# Patient Record
Sex: MALE | Race: BLACK OR AFRICAN AMERICAN | NOT HISPANIC OR LATINO
[De-identification: names, ages, dates, MRNs, and addresses within clinical notes are randomized per-mention and may not be internally consistent; named-entity substitution may affect disease eponyms.]

---

## 2018-04-30 PROBLEM — Z00.00 ENCOUNTER FOR PREVENTIVE HEALTH EXAMINATION: Status: ACTIVE | Noted: 2018-04-30

## 2018-05-08 ENCOUNTER — APPOINTMENT (OUTPATIENT)
Dept: SPINE | Facility: CLINIC | Age: 43
End: 2018-05-08
Payer: COMMERCIAL

## 2018-05-08 VITALS
BODY MASS INDEX: 22.96 KG/M2 | DIASTOLIC BLOOD PRESSURE: 74 MMHG | WEIGHT: 155 LBS | SYSTOLIC BLOOD PRESSURE: 118 MMHG | HEIGHT: 69 IN

## 2018-05-08 DIAGNOSIS — Z82.3 FAMILY HISTORY OF STROKE: ICD-10-CM

## 2018-05-08 DIAGNOSIS — Z80.42 FAMILY HISTORY OF MALIGNANT NEOPLASM OF PROSTATE: ICD-10-CM

## 2018-05-08 PROCEDURE — 99204 OFFICE O/P NEW MOD 45 MIN: CPT

## 2018-05-08 RX ORDER — NAPROXEN 500 MG/1
TABLET ORAL
Refills: 0 | Status: ACTIVE | COMMUNITY

## 2018-05-08 RX ORDER — METHOCARBAMOL 500 MG/1
500 TABLET, FILM COATED ORAL
Refills: 0 | Status: ACTIVE | COMMUNITY

## 2018-05-17 VITALS
HEART RATE: 65 BPM | DIASTOLIC BLOOD PRESSURE: 53 MMHG | SYSTOLIC BLOOD PRESSURE: 116 MMHG | HEIGHT: 69 IN | RESPIRATION RATE: 18 BRPM | TEMPERATURE: 98 F | WEIGHT: 159.84 LBS | OXYGEN SATURATION: 99 %

## 2018-05-18 ENCOUNTER — APPOINTMENT (OUTPATIENT)
Dept: SPINE | Facility: HOSPITAL | Age: 43
End: 2018-05-18
Payer: COMMERCIAL

## 2018-05-18 ENCOUNTER — INPATIENT (INPATIENT)
Facility: HOSPITAL | Age: 43
LOS: 0 days | Discharge: HOME | DRG: 520 | End: 2018-05-19
Attending: NEUROLOGICAL SURGERY | Admitting: NEUROLOGICAL SURGERY
Payer: COMMERCIAL

## 2018-05-18 LAB — GLUCOSE BLDC GLUCOMTR-MCNC: 120 MG/DL — HIGH (ref 70–99)

## 2018-05-18 PROCEDURE — 63047 LAM FACETEC & FORAMOT LUMBAR: CPT

## 2018-05-18 PROCEDURE — ZZZZZ: CPT

## 2018-05-18 PROCEDURE — 63047 LAM FACETEC & FORAMOT LUMBAR: CPT | Mod: 80

## 2018-05-18 RX ORDER — ONDANSETRON 8 MG/1
4 TABLET, FILM COATED ORAL EVERY 6 HOURS
Qty: 0 | Refills: 0 | Status: DISCONTINUED | OUTPATIENT
Start: 2018-05-18 | End: 2018-05-19

## 2018-05-18 RX ORDER — DEXAMETHASONE 0.5 MG/5ML
4 ELIXIR ORAL EVERY 6 HOURS
Qty: 0 | Refills: 0 | Status: COMPLETED | OUTPATIENT
Start: 2018-05-18 | End: 2018-05-19

## 2018-05-18 RX ORDER — SENNA PLUS 8.6 MG/1
2 TABLET ORAL AT BEDTIME
Qty: 0 | Refills: 0 | Status: DISCONTINUED | OUTPATIENT
Start: 2018-05-18 | End: 2018-05-19

## 2018-05-18 RX ORDER — CEFAZOLIN SODIUM 1 G
2000 VIAL (EA) INJECTION EVERY 8 HOURS
Qty: 0 | Refills: 0 | Status: COMPLETED | OUTPATIENT
Start: 2018-05-18 | End: 2018-05-19

## 2018-05-18 RX ORDER — INSULIN LISPRO 100/ML
VIAL (ML) SUBCUTANEOUS
Qty: 0 | Refills: 0 | Status: DISCONTINUED | OUTPATIENT
Start: 2018-05-18 | End: 2018-05-19

## 2018-05-18 RX ORDER — METHOCARBAMOL 500 MG/1
750 TABLET, FILM COATED ORAL
Qty: 0 | Refills: 0 | Status: DISCONTINUED | OUTPATIENT
Start: 2018-05-18 | End: 2018-05-19

## 2018-05-18 RX ORDER — GLUCAGON INJECTION, SOLUTION 0.5 MG/.1ML
1 INJECTION, SOLUTION SUBCUTANEOUS ONCE
Qty: 0 | Refills: 0 | Status: DISCONTINUED | OUTPATIENT
Start: 2018-05-18 | End: 2018-05-19

## 2018-05-18 RX ORDER — SODIUM CHLORIDE 9 MG/ML
1000 INJECTION, SOLUTION INTRAVENOUS
Qty: 0 | Refills: 0 | Status: DISCONTINUED | OUTPATIENT
Start: 2018-05-18 | End: 2018-05-19

## 2018-05-18 RX ORDER — HYDROMORPHONE HYDROCHLORIDE 2 MG/ML
0.5 INJECTION INTRAMUSCULAR; INTRAVENOUS; SUBCUTANEOUS
Qty: 0 | Refills: 0 | Status: DISCONTINUED | OUTPATIENT
Start: 2018-05-18 | End: 2018-05-18

## 2018-05-18 RX ORDER — ACETAMINOPHEN 500 MG
0 TABLET ORAL
Qty: 0 | Refills: 0 | COMMUNITY

## 2018-05-18 RX ORDER — OXYCODONE HYDROCHLORIDE 5 MG/1
10 TABLET ORAL EVERY 4 HOURS
Qty: 0 | Refills: 0 | Status: DISCONTINUED | OUTPATIENT
Start: 2018-05-18 | End: 2018-05-19

## 2018-05-18 RX ORDER — DEXTROSE 50 % IN WATER 50 %
15 SYRINGE (ML) INTRAVENOUS ONCE
Qty: 0 | Refills: 0 | Status: DISCONTINUED | OUTPATIENT
Start: 2018-05-18 | End: 2018-05-19

## 2018-05-18 RX ORDER — ACETAMINOPHEN 500 MG
1000 TABLET ORAL ONCE
Qty: 0 | Refills: 0 | Status: COMPLETED | OUTPATIENT
Start: 2018-05-18 | End: 2018-05-18

## 2018-05-18 RX ORDER — DEXTROSE 50 % IN WATER 50 %
25 SYRINGE (ML) INTRAVENOUS ONCE
Qty: 0 | Refills: 0 | Status: DISCONTINUED | OUTPATIENT
Start: 2018-05-18 | End: 2018-05-19

## 2018-05-18 RX ORDER — BUPIVACAINE 13.3 MG/ML
20 INJECTION, SUSPENSION, LIPOSOMAL INFILTRATION ONCE
Qty: 0 | Refills: 0 | Status: DISCONTINUED | OUTPATIENT
Start: 2018-05-18 | End: 2018-05-19

## 2018-05-18 RX ORDER — DOCUSATE SODIUM 100 MG
100 CAPSULE ORAL THREE TIMES A DAY
Qty: 0 | Refills: 0 | Status: DISCONTINUED | OUTPATIENT
Start: 2018-05-18 | End: 2018-05-19

## 2018-05-18 RX ORDER — METHOCARBAMOL 500 MG/1
2 TABLET, FILM COATED ORAL
Qty: 0 | Refills: 0 | COMMUNITY

## 2018-05-18 RX ORDER — HYDROMORPHONE HYDROCHLORIDE 2 MG/ML
0.5 INJECTION INTRAMUSCULAR; INTRAVENOUS; SUBCUTANEOUS EVERY 4 HOURS
Qty: 0 | Refills: 0 | Status: DISCONTINUED | OUTPATIENT
Start: 2018-05-18 | End: 2018-05-18

## 2018-05-18 RX ORDER — METOCLOPRAMIDE HCL 10 MG
5 TABLET ORAL EVERY 6 HOURS
Qty: 0 | Refills: 0 | Status: DISCONTINUED | OUTPATIENT
Start: 2018-05-18 | End: 2018-05-19

## 2018-05-18 RX ORDER — DEXTROSE 50 % IN WATER 50 %
12.5 SYRINGE (ML) INTRAVENOUS ONCE
Qty: 0 | Refills: 0 | Status: DISCONTINUED | OUTPATIENT
Start: 2018-05-18 | End: 2018-05-19

## 2018-05-18 RX ORDER — ONDANSETRON 8 MG/1
4 TABLET, FILM COATED ORAL ONCE
Qty: 0 | Refills: 0 | Status: DISCONTINUED | OUTPATIENT
Start: 2018-05-18 | End: 2018-05-19

## 2018-05-18 RX ORDER — PANTOPRAZOLE SODIUM 20 MG/1
40 TABLET, DELAYED RELEASE ORAL DAILY
Qty: 0 | Refills: 0 | Status: DISCONTINUED | OUTPATIENT
Start: 2018-05-18 | End: 2018-05-19

## 2018-05-18 RX ORDER — OXYCODONE HYDROCHLORIDE 5 MG/1
5 TABLET ORAL
Qty: 0 | Refills: 0 | Status: DISCONTINUED | OUTPATIENT
Start: 2018-05-18 | End: 2018-05-19

## 2018-05-18 RX ADMIN — Medication 400 MILLIGRAM(S): at 22:45

## 2018-05-18 RX ADMIN — ONDANSETRON 4 MILLIGRAM(S): 8 TABLET, FILM COATED ORAL at 17:46

## 2018-05-18 RX ADMIN — Medication 4 MILLIGRAM(S): at 17:46

## 2018-05-18 RX ADMIN — Medication 5 MILLIGRAM(S): at 21:20

## 2018-05-18 RX ADMIN — Medication 100 MILLIGRAM(S): at 21:19

## 2018-05-18 RX ADMIN — SENNA PLUS 2 TABLET(S): 8.6 TABLET ORAL at 22:43

## 2018-05-18 RX ADMIN — Medication 100 MILLIGRAM(S): at 22:43

## 2018-05-18 RX ADMIN — Medication 1000 MILLIGRAM(S): at 23:00

## 2018-05-18 NOTE — CONSULT NOTE ADULT - SUBJECTIVE AND OBJECTIVE BOX
Pre-surgical Pain Inventory  HPI:  This patient is a 42 year old man w/ a large L4-L5 herniated disc causing left foot weakness and bilateral lower extremity radicular pain that has been worsening in severity.  The note from the patient's office visit w/ Dr. Rice is pasted below:   Reports concern of LEFT foot weakness which began 1-2 months ago.  Attribute symptom onset to bending forward to grab something off the floor. Initially the symptoms were excruciating lower back pain with radiation to bilateral lower extremities LEFT > RIGHT. As time progressed he noticed accompanying symptoms of numbness/tingling of the extremities and weakness of the foot   He was initially treated with supervised physical therapy which seemed to alleviate the pain but the weakness of the foot was w/o improvement. He is currently on a NSAID regimen and muscle relaxers.  Reports difficulty walking/standing for extended periods.  Denies bladder/bowel dysfunction     Right: L4/5 heel walking 5/5, S1 toe walking 5/5.   Left: L4/5 heel walking 0/5 , S1 toe walking 4/5.   Sensory exam: light touch was intact and pain and temperature was intact.   Coordination:. the patient's balance was impaired.   Deep tendon reflexes:   Patella right 2+. Patella left 2+.    Ankle jerk right 2+. Ankle jerk left 2+.    Spine:   Lumbar/sacral spine examination demonstrates no visible abnormailities . straight leg raise of the left leg was positive. straight leg raise of the right leg was negative.   Gait: antalgic the patient was able to toe walk. the patient was not able to heel walk. (18 May 2018 12:21)      [facility]    Surgeon: Dr. Rice	    PAST MEDICAL & SURGICAL HISTORY:  Lumbar herniated disc  No significant past surgical history      Current Pre-op Pain Medications  Naproxen  Tylenol    Prescribed by: PCP    Past Pain Medication:   [du] Oxycodone  [du] Hydrocodone  [] Methadone  [] Fentanyl  [] Dilaudid  [] Morphine  [] Tramadol  [x] NSAIDs/Anti-inflammatories  [x] Tylenol   [] Medrol/Decadron  [] Neuropathic Agents  [x] Muscle Relaxants: Robaxin, no longer taking  [] Anxiolytics  [] Anti-depressants   [] Sleep aids    Allergies    No Known Allergies    Intolerances        Vital Signs:  Vital Signs Last 24 Hrs  T(C): --  T(F): --  HR: --  BP: --  BP(mean): --  RR: --  SpO2: --    Labs:       FUNCTIONAL ASSESSMENT:  AVERAGE DAILY PAIN SCORE: 6/10    PAIN ASSESSMENT:  Pt reports hx of LBP for several years which has worsened over past 2 months. Pt reports worsening back pain with significantly worsening BLE posterior/outer aspect LE numbness/tingling/sharp shooting pain with activity or prolonged sitting. States that he is unable to walk more than several feet as the BLE radicular pain becomes debilitating. Pt states pain is L>R posterior/outer aspect of thigh and extends more in LLE into foot and ankle vs shin with significant L foot weakness (foot drop) and stiffness.     Assessment:   42y Male presents for L45 [facility] Pre-surgical Pain Inventory  HPI:  This patient is a 42 year old man w/ a large L4-L5 herniated disc causing left foot weakness and bilateral lower extremity radicular pain that has been worsening in severity.  The note from the patient's office visit w/ Dr. Rice is pasted below:   Reports concern of LEFT foot weakness which began 1-2 months ago.  Attribute symptom onset to bending forward to grab something off the floor. Initially the symptoms were excruciating lower back pain with radiation to bilateral lower extremities LEFT > RIGHT. As time progressed he noticed accompanying symptoms of numbness/tingling of the extremities and weakness of the foot   He was initially treated with supervised physical therapy which seemed to alleviate the pain but the weakness of the foot was w/o improvement. He is currently on a NSAID regimen and muscle relaxers.  Reports difficulty walking/standing for extended periods.  Denies bladder/bowel dysfunction     Right: L4/5 heel walking 5/5, S1 toe walking 5/5.   Left: L4/5 heel walking 0/5 , S1 toe walking 4/5.   Sensory exam: light touch was intact and pain and temperature was intact.   Coordination:. the patient's balance was impaired.   Deep tendon reflexes:   Patella right 2+. Patella left 2+.    Ankle jerk right 2+. Ankle jerk left 2+.    Spine:   Lumbar/sacral spine examination demonstrates no visible abnormailities . straight leg raise of the left leg was positive. straight leg raise of the right leg was negative.   Gait: antalgic the patient was able to toe walk. the patient was not able to heel walk. (18 May 2018 12:21)      [facility]    Surgeon: Dr. Rice	    PAST MEDICAL & SURGICAL HISTORY:  Lumbar herniated disc  No significant past surgical history      Current Pre-op Pain Medications  Naproxen  Tylenol    Prescribed by: PCP    Past Pain Medication:   [du] Oxycodone  [du] Hydrocodone  [] Methadone  [] Fentanyl  [] Dilaudid  [] Morphine  [] Tramadol  [x] NSAIDs/Anti-inflammatories  [x] Tylenol   [] Medrol/Decadron  [] Neuropathic Agents  [x] Muscle Relaxants: Robaxin, no longer taking  [] Anxiolytics  [] Anti-depressants   [] Sleep aids    Allergies    No Known Allergies    Intolerances        Vital Signs:  Vital Signs Last 24 Hrs  T(C): --  T(F): --  HR: --  BP: --  BP(mean): --  RR: --  SpO2: --    Labs:       FUNCTIONAL ASSESSMENT:  AVERAGE DAILY PAIN SCORE: 6/10    PAIN ASSESSMENT:  Pt reports hx of LBP for several years which has worsened over past 2 months. Pt reports worsening back pain with significantly worsening BLE posterior/outer aspect LE numbness/tingling/sharp shooting pain with activity or prolonged sitting. States that he is unable to walk more than several feet as the BLE radicular pain becomes debilitating. Pt states pain is L>R posterior/outer aspect of thigh and extends more in LLE into foot and ankle vs shin with significant L foot weakness (foot drop) and stiffness.     Assessment:   42y Male presents for L45 lami    Plan:   Immediate post-op plan  - Dilaudid 0.5mg every 1 hour for Severe Breakthrough Pain -> every 3 hours for Severe Breakthrough Pain Floor->PACU  - Percocet 1-2 tabs every 4 hours for Mod-Severe Pain  - Robaxin 750mg TID PRN for Spasms    Rescue plan  - Dilaudid IVP -> PO if persistent  - Robaxin -> Valium if significant spasms    Tentative floor plan  - Robaxin  - Percocet  - Lyrica if persistent neuropathic pain    D/w Dr. Block

## 2018-05-18 NOTE — BRIEF OPERATIVE NOTE - PROCEDURE
<<-----Click on this checkbox to enter Procedure Lumbar laminectomy with discectomy  05/18/2018  L4 laminectomy for L4-L5 discectomy  Active  [id number]

## 2018-05-18 NOTE — H&P PST ADULT - HISTORY OF PRESENT ILLNESS
This patient is a 42 year old man w/ a large L4-L5 herniated disc causing left foot weakness and bilateral lower extremity radicular pain that has been worsening in severity.  The note from the patient's office visit w/ Dr. Rice is pasted below:   Reports concern of LEFT foot weakness which began 1-2 months ago.  Attribute symptom onset to bending forward to grab something off the floor. Initially the symptoms were excruciating lower back pain with radiation to bilateral lower extremities LEFT > RIGHT. As time progressed he noticed accompanying symptoms of numbness/tingling of the extremities and weakness of the foot   He was initially treated with supervised physical therapy which seemed to alleviate the pain but the weakness of the foot was w/o improvement. He is currently on a NSAID regimen and muscle relaxers.  Reports difficulty walking/standing for extended periods.  Denies bladder/bowel dysfunction     Right: L4/5 heel walking 5/5, S1 toe walking 5/5.   Left: L4/5 heel walking 0/5 , S1 toe walking 4/5.   Sensory exam: light touch was intact and pain and temperature was intact.   Coordination:. the patient's balance was impaired.   Deep tendon reflexes:   Patella right 2+. Patella left 2+.    Ankle jerk right 2+. Ankle jerk left 2+.    Spine:   Lumbar/sacral spine examination demonstrates no visible abnormailities . straight leg raise of the left leg was positive. straight leg raise of the right leg was negative.   Gait: antalgic the patient was able to toe walk. the patient was not able to heel walk.

## 2018-05-19 ENCOUNTER — TRANSCRIPTION ENCOUNTER (OUTPATIENT)
Age: 43
End: 2018-05-19

## 2018-05-19 VITALS
RESPIRATION RATE: 17 BRPM | HEART RATE: 81 BPM | SYSTOLIC BLOOD PRESSURE: 111 MMHG | DIASTOLIC BLOOD PRESSURE: 69 MMHG | OXYGEN SATURATION: 99 % | TEMPERATURE: 99 F

## 2018-05-19 LAB
ANION GAP SERPL CALC-SCNC: 11 MMOL/L — SIGNIFICANT CHANGE UP (ref 5–17)
BUN SERPL-MCNC: 11 MG/DL — SIGNIFICANT CHANGE UP (ref 7–23)
CALCIUM SERPL-MCNC: 9.4 MG/DL — SIGNIFICANT CHANGE UP (ref 8.4–10.5)
CHLORIDE SERPL-SCNC: 98 MMOL/L — SIGNIFICANT CHANGE UP (ref 96–108)
CO2 SERPL-SCNC: 27 MMOL/L — SIGNIFICANT CHANGE UP (ref 22–31)
CREAT SERPL-MCNC: 0.82 MG/DL — SIGNIFICANT CHANGE UP (ref 0.5–1.3)
GLUCOSE BLDC GLUCOMTR-MCNC: 128 MG/DL — HIGH (ref 70–99)
GLUCOSE BLDC GLUCOMTR-MCNC: 148 MG/DL — HIGH (ref 70–99)
GLUCOSE SERPL-MCNC: 121 MG/DL — HIGH (ref 70–99)
HBA1C BLD-MCNC: 5.2 % — SIGNIFICANT CHANGE UP (ref 4–5.6)
HCT VFR BLD CALC: 32.6 % — LOW (ref 39–50)
HGB BLD-MCNC: 10.7 G/DL — LOW (ref 13–17)
MCHC RBC-ENTMCNC: 30.4 PG — SIGNIFICANT CHANGE UP (ref 27–34)
MCHC RBC-ENTMCNC: 32.8 G/DL — SIGNIFICANT CHANGE UP (ref 32–36)
MCV RBC AUTO: 92.6 FL — SIGNIFICANT CHANGE UP (ref 80–100)
PLATELET # BLD AUTO: 282 K/UL — SIGNIFICANT CHANGE UP (ref 150–400)
POTASSIUM SERPL-MCNC: 4 MMOL/L — SIGNIFICANT CHANGE UP (ref 3.5–5.3)
POTASSIUM SERPL-SCNC: 4 MMOL/L — SIGNIFICANT CHANGE UP (ref 3.5–5.3)
RBC # BLD: 3.52 M/UL — LOW (ref 4.2–5.8)
RBC # FLD: 12.7 % — SIGNIFICANT CHANGE UP (ref 10.3–16.9)
SODIUM SERPL-SCNC: 136 MMOL/L — SIGNIFICANT CHANGE UP (ref 135–145)
WBC # BLD: 22.4 K/UL — HIGH (ref 3.8–10.5)
WBC # FLD AUTO: 22.4 K/UL — HIGH (ref 3.8–10.5)

## 2018-05-19 RX ORDER — ACETAMINOPHEN 500 MG
650 TABLET ORAL EVERY 6 HOURS
Qty: 0 | Refills: 0 | Status: DISCONTINUED | OUTPATIENT
Start: 2018-05-19 | End: 2018-05-19

## 2018-05-19 RX ORDER — ACETAMINOPHEN 500 MG
1000 TABLET ORAL ONCE
Qty: 0 | Refills: 0 | Status: COMPLETED | OUTPATIENT
Start: 2018-05-19 | End: 2018-05-19

## 2018-05-19 RX ORDER — OXYCODONE HYDROCHLORIDE 5 MG/1
1 TABLET ORAL
Qty: 56 | Refills: 0 | OUTPATIENT
Start: 2018-05-19 | End: 2018-05-25

## 2018-05-19 RX ADMIN — Medication 100 MILLIGRAM(S): at 13:24

## 2018-05-19 RX ADMIN — Medication 100 MILLIGRAM(S): at 06:28

## 2018-05-19 RX ADMIN — Medication 4 MILLIGRAM(S): at 06:30

## 2018-05-19 RX ADMIN — Medication 100 MILLIGRAM(S): at 06:29

## 2018-05-19 RX ADMIN — PANTOPRAZOLE SODIUM 40 MILLIGRAM(S): 20 TABLET, DELAYED RELEASE ORAL at 11:26

## 2018-05-19 RX ADMIN — SODIUM CHLORIDE 75 MILLILITER(S): 9 INJECTION, SOLUTION INTRAVENOUS at 02:35

## 2018-05-19 RX ADMIN — Medication 1000 MILLIGRAM(S): at 08:00

## 2018-05-19 RX ADMIN — Medication 400 MILLIGRAM(S): at 07:30

## 2018-05-19 RX ADMIN — Medication 4 MILLIGRAM(S): at 11:26

## 2018-05-19 RX ADMIN — METHOCARBAMOL 750 MILLIGRAM(S): 500 TABLET, FILM COATED ORAL at 14:02

## 2018-05-19 RX ADMIN — Medication 4 MILLIGRAM(S): at 01:00

## 2018-05-19 NOTE — PHYSICAL THERAPY INITIAL EVALUATION ADULT - ADDITIONAL COMMENTS
Pt lives in an apartment with 30 steps to enter from the outside with his wife, children and extended family. Previously ambulated with no AD, however reported he had LLE weakness and min difficulty ambulating. Does not own RW or cane.

## 2018-05-19 NOTE — PHYSICAL THERAPY INITIAL EVALUATION ADULT - PERTINENT HX OF CURRENT PROBLEM, REHAB EVAL
This patient is a 42 year old man w/ a large L4-L5 herniated disc causing left foot weakness and bilateral lower extremity radicular pain that has been worsening in severity.

## 2018-05-19 NOTE — PHYSICAL THERAPY INITIAL EVALUATION ADULT - DIAGNOSIS, PT EVAL
Impaired Joint Mobility, Motor Function, Muscle Performance, and Range of Motion Associated and Reflex Integrity Associated with Spinal Disorders.

## 2018-05-19 NOTE — DISCHARGE NOTE ADULT - PLAN OF CARE
Return home to regain independent activity Keep your wound clean and dry.  Once a day, ask a family member to check your incision for signs of infection such as: Redness, swelling tenderness, or drainage  • You may use stairs as tolerated.  • You may shower briefly, unless you told not to by your doctor.   Cover your entire incision with a waterproof bandage to make sure it does not get wet. If it gets wet, dry it off.   No tub baths or swimming for two weeks. Take pain medicine as prescribed.   You may find it helpful to take it for morning stiffness or for soreness when trying to sleep.  o Pain medication can cause constipation. Eating food with fiber such as fruits and  vegetables, and drinking liquids may help prevent constipation.  • Avoid bending, twisting or heavy lifting.  • Do not sit for more than 20 minutes each time you sit.  • Do not wear pants that are tight on your incision.  Call you doctor immediately if you have:  • Any new numbness, tingling, or weakness in your arms and legs.  Worsening pain not responsive to pain meds, Fever of 101° F or more.  You must call the office to make a follow- up appointment with your doctor.  To make your appointment for any questions, please call: [phone].

## 2018-05-19 NOTE — DISCHARGE NOTE ADULT - MEDICATION SUMMARY - MEDICATIONS TO TAKE
I will START or STAY ON the medications listed below when I get home from the hospital:    Medrol Dosepak 4 mg oral tablet  -- See packet for instructions  -- It is very important that you take or use this exactly as directed.  Do not skip doses or discontinue unless directed by your doctor.  Obtain medical advice before taking any non-prescription drugs as some may affect the action of this medication.  Take with food or milk.    -- Indication: For For swelling    oxyCODONE 5 mg oral tablet  -- 1 tab(s) by mouth every 3 hours, As needed, Moderate Pain (4 - 6) MDD:8 tabs  -- Indication: For Severe pain    Tylenol 325 mg oral tablet  -- orally 4 times a day, As Needed  -- Indication: For Mild pain    methocarbamol 500 mg oral tablet  -- 2 tab(s) by mouth 4 times a day, As Needed  -- Indication: For Muscle relaxant

## 2018-05-19 NOTE — DISCHARGE NOTE ADULT - CARE PROVIDER_API CALL
Pablo Rice), Neurological Surgery  [address], [place] [facility]  Phone: [phone]  Fax: [phone]

## 2018-05-19 NOTE — PROGRESS NOTE ADULT - SUBJECTIVE AND OBJECTIVE BOX
Post op Note    Dx: L4-L5 HNP with left foot weakness    42y    Male   s/p L4 laminectomy, L4-L5 discectomy. POD #0. Neuro stable.   T(C): --  HR: 62 (05-18-18 @ 17:30) (62 - 72)  BP: 111/53 (05-18-18 @ 17:30) (109/54 - 117/55)  RR: 9 (05-18-18 @ 17:30) (9 - 16)  SpO2: 100% (05-18-18 @ 17:30) (100% - 100%)  Wt(kg): --      Physical exam  Awake, alert, oriented x 3,   Follows commands, speech clear  Motor: 5/5 b/l UE, 5/5 b/l knee flexion, hip flexion, foot plantar flexion  LLE 4/5 dorsiflexion, 0/5 EHL; RLE 5/5 dorsiflexion   Sensation intact to light touch.   Incision: Lumbar region: C/D/I; dressing in place   No ha. no drains      Plan:   - Neurocheck q 1 hour  - Vitals q 1 hour  - Pain control as needed with Tylenol, oxycodone,   - Dilaudid IV push for breakthrough pain  - Robaxin for muscle spam  - Ancef 3 doses   - Advance diet as tolerated  - IVF until PO diet   - PPI for ulcer ppx, on decadron  - ISS   - Continue decadron taper   - OOB/ambulate   - For home tomorrow  - DVT ppx: venodynes   d/w Dr. Rice
OVERNIGHT EVENTS: Overnight Pt received reglan for nausea, which resolved.     Vital Signs Last 24 Hrs  T(C): 37.1 (19 May 2018 05:36), Max: 37.1 (19 May 2018 05:36)  T(F): 98.8 (19 May 2018 05:36), Max: 98.8 (19 May 2018 05:36)  HR: 89 (19 May 2018 05:36) (62 - 89)  BP: 127/66 (19 May 2018 05:36) (91/56 - 127/66)  BP(mean): 72 (18 May 2018 18:45) (68 - 74)  RR: 16 (19 May 2018 05:36) (9 - 16)  SpO2: 98% (19 May 2018 05:36) (98% - 100%)    I&O's Summary    18 May 2018 07:01  -  19 May 2018 07:00  --------------------------------------------------------  IN: 1316.3 mL / OUT: 780 mL / NET: 536.3 mL      PHYSICAL EXAM:  Neurological: AAOx3, FC, speech coherent  CNII-XII: EOM intact, PERRL, face symmetric, tongue midline  Motor: MAEx4 RUE/LUE/RLE 5/5, LLE HF/KE/KF/PF 5/5, DF 4-/5, EHL 0/5         [x] warm well perfused; capillary refill <3 seconds   SILT  Incision/Wound: Back incision site C/D/I, steri strips in place    TUBES/LINES:  [] Tran  [] Lumbar Drain  [] Wound Drains  [] Others    DIET:  [] NPO  [x] Mechanical  [] Tube feeds    LABS:                        10.7   22.4  )-----------( 282      ( 19 May 2018 08:09 )             32.6     05-19    136  |  98  |  11  ----------------------------<  121<H>  4.0   |  27  |  0.82    Ca    9.4      19 May 2018 08:09              CAPILLARY BLOOD GLUCOSE      POCT Blood Glucose.: 128 mg/dL (19 May 2018 06:57)  POCT Blood Glucose.: 120 mg/dL (18 May 2018 22:32)      Drug Levels: [] N/A    CSF Analysis: [] N/A      Allergies    No Known Allergies    Intolerances      MEDICATIONS:  Antibiotics:    Neuro:  HYDROmorphone  Injectable 0.5 milliGRAM(s) IV Push every 4 hours PRN  methocarbamol 750 milliGRAM(s) Oral four times a day PRN  metoclopramide Injectable 5 milliGRAM(s) IV Push every 6 hours PRN  ondansetron Injectable 4 milliGRAM(s) IV Push every 6 hours PRN  ondansetron Injectable 4 milliGRAM(s) IV Push once  oxyCODONE    IR 5 milliGRAM(s) Oral every 3 hours PRN  oxyCODONE    IR 10 milliGRAM(s) Oral every 4 hours PRN    Anticoagulation:    OTHER:  BUpivacaine liposome 1.3% Injectable (no eMAR) 20 milliLiter(s) Local Injection once  dexamethasone  Injectable 4 milliGRAM(s) IV Push every 6 hours  dextrose 40% Gel 15 Gram(s) Oral once PRN  dextrose 50% Injectable 12.5 Gram(s) IV Push once  dextrose 50% Injectable 25 Gram(s) IV Push once  dextrose 50% Injectable 25 Gram(s) IV Push once  docusate sodium 100 milliGRAM(s) Oral three times a day  glucagon  Injectable 1 milliGRAM(s) IntraMuscular once PRN  insulin lispro (HumaLOG) corrective regimen sliding scale   SubCutaneous Before meals and at bedtime  pantoprazole  Injectable 40 milliGRAM(s) IV Push daily  senna 2 Tablet(s) Oral at bedtime    IVF:  dextrose 5%. 1000 milliLiter(s) IV Continuous <Continuous>  lactated ringers. 1000 milliLiter(s) IV Continuous <Continuous>    CULTURES:    RADIOLOGY & ADDITIONAL TESTS:    ASSESSMENT:  42y Male s/p L4 laminectomy for L4-L5 discectomy, POD#1    M51.26  No h/o HF  Handoff  MEWS Score  Lumbar herniated disc  Herniated lumbar intervertebral disc  Herniated lumbar intervertebral disc  Lumbar laminectomy with discectomy  No significant past surgical history      PLAN:  -neuro checks  -pain control prn robaxin, oxycodone, dilaudid  -decadron 4mg q6h, plan to discharge with medrol dosepak  -PPI while on steroids  -DVT prophylaxis: SCDs  -PT/OOB  -Postop ancef  -Dispo pending: home today likely  -D/w Dr. Rice

## 2018-05-19 NOTE — DISCHARGE NOTE ADULT - PATIENT PORTAL LINK FT
You can access the [facility] Patient Portal, offered by [facility], by registering with the following website: [email or web address]

## 2018-05-19 NOTE — DISCHARGE NOTE ADULT - MEDICATION SUMMARY - MEDICATIONS TO STOP TAKING
I will STOP taking the medications listed below when I get home from the hospital:    Naprosyn 500 mg oral tablet  -- 1 tab(s) by mouth 2 times a day

## 2018-05-19 NOTE — PHYSICAL THERAPY INITIAL EVALUATION ADULT - ACTIVE RANGE OF MOTION EXAMINATION, REHAB EVAL
bilateral upper extremity Active ROM was WFL (within functional limits)/b/l LE AROM WFL with exception of ankle dorsiflexion secondary to weakness

## 2018-05-19 NOTE — PHYSICAL THERAPY INITIAL EVALUATION ADULT - GROSSLY INTACT, SENSORY
grossly intact b/l UE and RLE; diminished sensation L foot and anterior calf, reports of tingling L plantar surface

## 2018-05-19 NOTE — DISCHARGE NOTE ADULT - NS AS ACTIVITY OBS
Walking-Indoors allowed/Walking-Outdoors allowed/Stairs allowed/No Heavy lifting/straining/Showering allowed

## 2018-05-19 NOTE — PHYSICAL THERAPY INITIAL EVALUATION ADULT - GAIT DEVIATIONS NOTED, PT EVAL
decreased step length/decreased stride length/decreased weight-shifting ability/LLE footdrop, in LLE hip flexion to compensate for dec DF strength/decreased john

## 2018-05-19 NOTE — DISCHARGE NOTE ADULT - CARE PLAN
Principal Discharge DX:	Lumbar herniated disc  Goal:	Return home to regain independent activity  Assessment and plan of treatment:	Keep your wound clean and dry.  Once a day, ask a family member to check your incision for signs of infection such as: Redness, swelling tenderness, or drainage  • You may use stairs as tolerated.  • You may shower briefly, unless you told not to by your doctor.   Cover your entire incision with a waterproof bandage to make sure it does not get wet. If it gets wet, dry it off.   No tub baths or swimming for two weeks.  Assessment and plan of treatment:	Take pain medicine as prescribed.   You may find it helpful to take it for morning stiffness or for soreness when trying to sleep.  o Pain medication can cause constipation. Eating food with fiber such as fruits and  vegetables, and drinking liquids may help prevent constipation.  • Avoid bending, twisting or heavy lifting.  • Do not sit for more than 20 minutes each time you sit.  • Do not wear pants that are tight on your incision.  Call you doctor immediately if you have:  • Any new numbness, tingling, or weakness in your arms and legs.  Worsening pain not responsive to pain meds, Fever of 101° F or more.  You must call the office to make a follow- up appointment with your doctor.  To make your appointment for any questions, please call: [phone].

## 2018-05-21 PROCEDURE — 80048 BASIC METABOLIC PNL TOTAL CA: CPT

## 2018-05-21 PROCEDURE — 83036 HEMOGLOBIN GLYCOSYLATED A1C: CPT

## 2018-05-21 PROCEDURE — 82962 GLUCOSE BLOOD TEST: CPT

## 2018-05-21 PROCEDURE — 76000 FLUOROSCOPY <1 HR PHYS/QHP: CPT

## 2018-05-21 PROCEDURE — 97161 PT EVAL LOW COMPLEX 20 MIN: CPT

## 2018-05-21 PROCEDURE — 85027 COMPLETE CBC AUTOMATED: CPT

## 2018-05-21 PROCEDURE — 36415 COLL VENOUS BLD VENIPUNCTURE: CPT

## 2018-05-22 DIAGNOSIS — M51.26 OTHER INTERVERTEBRAL DISC DISPLACEMENT, LUMBAR REGION: ICD-10-CM

## 2018-05-22 DIAGNOSIS — R11.0 NAUSEA: ICD-10-CM

## 2018-05-22 DIAGNOSIS — M54.16 RADICULOPATHY, LUMBAR REGION: ICD-10-CM

## 2018-05-22 DIAGNOSIS — M21.371 FOOT DROP, RIGHT FOOT: ICD-10-CM

## 2018-05-22 DIAGNOSIS — Z80.42 FAMILY HISTORY OF MALIGNANT NEOPLASM OF PROSTATE: ICD-10-CM

## 2018-05-29 ENCOUNTER — APPOINTMENT (OUTPATIENT)
Dept: SPINE | Facility: CLINIC | Age: 43
End: 2018-05-29
Payer: COMMERCIAL

## 2018-05-29 VITALS
HEIGHT: 69 IN | BODY MASS INDEX: 22.96 KG/M2 | SYSTOLIC BLOOD PRESSURE: 108 MMHG | DIASTOLIC BLOOD PRESSURE: 68 MMHG | WEIGHT: 155 LBS | RESPIRATION RATE: 88 BRPM

## 2018-05-29 PROCEDURE — 99024 POSTOP FOLLOW-UP VISIT: CPT

## 2018-06-07 DIAGNOSIS — M62.830 MUSCLE SPASM OF BACK: ICD-10-CM

## 2018-07-11 ENCOUNTER — APPOINTMENT (OUTPATIENT)
Dept: SPINE | Facility: CLINIC | Age: 43
End: 2018-07-11
Payer: COMMERCIAL

## 2018-07-11 VITALS
WEIGHT: 153 LBS | OXYGEN SATURATION: 99 % | DIASTOLIC BLOOD PRESSURE: 76 MMHG | RESPIRATION RATE: 18 BRPM | BODY MASS INDEX: 22.66 KG/M2 | SYSTOLIC BLOOD PRESSURE: 108 MMHG | HEIGHT: 69 IN | HEART RATE: 70 BPM | TEMPERATURE: 98.1 F

## 2018-07-11 DIAGNOSIS — Z09 ENCOUNTER FOR FOLLOW-UP EXAMINATION AFTER COMPLETED TREATMENT FOR CONDITIONS OTHER THAN MALIGNANT NEOPLASM: ICD-10-CM

## 2018-07-11 PROCEDURE — 99024 POSTOP FOLLOW-UP VISIT: CPT

## 2018-09-11 ENCOUNTER — APPOINTMENT (OUTPATIENT)
Dept: NEUROSURGERY | Facility: CLINIC | Age: 43
End: 2018-09-11
Payer: COMMERCIAL

## 2018-09-11 VITALS
TEMPERATURE: 98.4 F | RESPIRATION RATE: 18 BRPM | OXYGEN SATURATION: 100 % | DIASTOLIC BLOOD PRESSURE: 69 MMHG | WEIGHT: 151 LBS | HEIGHT: 69 IN | HEART RATE: 66 BPM | BODY MASS INDEX: 22.36 KG/M2 | SYSTOLIC BLOOD PRESSURE: 106 MMHG

## 2018-09-11 DIAGNOSIS — M54.16 RADICULOPATHY, LUMBAR REGION: ICD-10-CM

## 2018-09-11 DIAGNOSIS — M21.40 FLAT FOOT [PES PLANUS] (ACQUIRED), UNSPECIFIED FOOT: ICD-10-CM

## 2018-09-11 DIAGNOSIS — M51.26 OTHER INTERVERTEBRAL DISC DISPLACEMENT, LUMBAR REGION: ICD-10-CM

## 2018-09-11 PROCEDURE — 99214 OFFICE O/P EST MOD 30 MIN: CPT

## 2018-09-12 ENCOUNTER — APPOINTMENT (OUTPATIENT)
Dept: SPINE | Facility: CLINIC | Age: 43
End: 2018-09-12

## 2019-04-24 ENCOUNTER — APPOINTMENT (OUTPATIENT)
Dept: SPINE | Facility: CLINIC | Age: 44
End: 2019-04-24

## 2024-11-14 NOTE — DISCHARGE NOTE ADULT - CONDITION (STATED IN TERMS THAT PERMIT A SPECIFIC MEASURABLE COMPARISON WITH CONDITION ON ADMISSION):
[place] [id number]  [phone]    Call today  To schedule a follow-up appointment       DISCHARGE MEDICATIONS:   New Prescriptions    ONDANSETRON (ZOFRAN-ODT) 4 MG DISINTEGRATING TABLET    Take 1 tablet by mouth 3 times daily as needed for Nausea or Vomiting    OXYCODONE-ACETAMINOPHEN (PERCOCET) 5-325 MG PER TABLET    Take 1 tablet by mouth every 6 hours as needed for Pain for up to 3 days. Intended supply: 3 days. Take lowest dose possible to manage pain Max Daily Amount: 4 tablets        DISCONTINUED MEDICATIONS:   Discontinued Medications    No medications on file              (Please note that portions of this note were completed with a voice recognition program.  Efforts were made to edit the dictations but occasionally words are mis-transcribed.)       Eron Adams MD (electronically signed)              Eron Adams MD  11/14/24 0744     Stable

## 2025-07-03 NOTE — PATIENT PROFILE ADULT. - PAIN LOCATION, PROFILE
Orders:    Ambulatory Referral to Orthopedic Surgery    Ambulatory Referral to Physical Therapy; Future       Orders:    Ambulatory Referral to Orthopedic Surgery    XR shoulder 2+ vw right; Future    Ambulatory Referral to Physical Therapy; Future    Large joint arthrocentesis: R glenohumeral     lumbar spine DC instructions